# Patient Record
Sex: MALE | Race: WHITE | Employment: FULL TIME | URBAN - METROPOLITAN AREA
[De-identification: names, ages, dates, MRNs, and addresses within clinical notes are randomized per-mention and may not be internally consistent; named-entity substitution may affect disease eponyms.]

---

## 2018-01-09 ENCOUNTER — HOSPITAL ENCOUNTER (OUTPATIENT)
Age: 43
Discharge: HOME | End: 2018-01-09
Payer: COMMERCIAL

## 2018-01-09 VITALS
OXYGEN SATURATION: 98 % | HEART RATE: 84 BPM | TEMPERATURE: 98 F | SYSTOLIC BLOOD PRESSURE: 111 MMHG | DIASTOLIC BLOOD PRESSURE: 75 MMHG | RESPIRATION RATE: 16 BRPM

## 2018-01-09 DIAGNOSIS — J11.1 INFLUENZA: Primary | ICD-10-CM

## 2018-01-09 PROCEDURE — 99214 OFFICE O/P EST MOD 30 MIN: CPT

## 2018-01-09 PROCEDURE — 99213 OFFICE O/P EST LOW 20 MIN: CPT

## 2018-01-09 RX ORDER — ACETAMINOPHEN 500 MG
1000 TABLET ORAL EVERY 6 HOURS PRN
COMMUNITY

## 2018-01-09 RX ORDER — OSELTAMIVIR PHOSPHATE 75 MG/1
75 CAPSULE ORAL 2 TIMES DAILY
Qty: 10 CAPSULE | Refills: 0 | Status: SHIPPED | OUTPATIENT
Start: 2018-01-09

## 2018-01-09 NOTE — ED PROVIDER NOTES
Patient Seen in: [facility]    History   Patient presents with:  Fever    Stated Complaint: fever/flu symptoms    HPI    41-year-old male here with complaint of fever cough body aches and flulike symptoms.   Patient was here yest Pupils are equal, round, and reactive to light. Neck: Normal range of motion. Neck supple. Cardiovascular: Normal rate, regular rhythm, normal heart sounds and intact distal pulses. Pulmonary/Chest: Effort normal. He has rhonchi.    Genitourinary: Re

## 2018-01-09 NOTE — ED INITIAL ASSESSMENT (HPI)
Onset last night with fever t-max 103 with chills and body aches. Took Tylenol at 0600 AM. Exposed to spouse diagnosed with flu-like symptoms.

## 2020-03-01 ENCOUNTER — HOSPITAL ENCOUNTER (OUTPATIENT)
Age: 45
Discharge: HOME | End: 2020-03-01
Payer: COMMERCIAL

## 2020-03-01 VITALS
TEMPERATURE: 99 F | HEIGHT: 70 IN | HEART RATE: 72 BPM | BODY MASS INDEX: 25.05 KG/M2 | OXYGEN SATURATION: 98 % | WEIGHT: 175 LBS | DIASTOLIC BLOOD PRESSURE: 98 MMHG | SYSTOLIC BLOOD PRESSURE: 152 MMHG | RESPIRATION RATE: 16 BRPM

## 2020-03-01 DIAGNOSIS — J22 LOWER RESPIRATORY INFECTION (E.G., BRONCHITIS, PNEUMONIA, PNEUMONITIS, PULMONITIS): ICD-10-CM

## 2020-03-01 DIAGNOSIS — J01.40 ACUTE NON-RECURRENT PANSINUSITIS: Primary | ICD-10-CM

## 2020-03-01 PROCEDURE — 94640 AIRWAY INHALATION TREATMENT: CPT

## 2020-03-01 PROCEDURE — 99214 OFFICE O/P EST MOD 30 MIN: CPT

## 2020-03-01 RX ORDER — IPRATROPIUM BROMIDE AND ALBUTEROL SULFATE 2.5; .5 MG/3ML; MG/3ML
3 SOLUTION RESPIRATORY (INHALATION) ONCE
Status: COMPLETED | OUTPATIENT
Start: 2020-03-01 | End: 2020-03-01

## 2020-03-01 RX ORDER — ALBUTEROL SULFATE 2.5 MG/3ML
2.5 SOLUTION RESPIRATORY (INHALATION) ONCE
Status: COMPLETED | OUTPATIENT
Start: 2020-03-01 | End: 2020-03-01

## 2020-03-01 RX ORDER — PREDNISONE 20 MG/1
40 TABLET ORAL DAILY
Qty: 8 TABLET | Refills: 0 | Status: SHIPPED | OUTPATIENT
Start: 2020-03-02 | End: 2020-03-06

## 2020-03-01 RX ORDER — ALBUTEROL SULFATE 90 UG/1
2 AEROSOL, METERED RESPIRATORY (INHALATION) EVERY 4 HOURS PRN
Qty: 1 INHALER | Refills: 0 | Status: SHIPPED | OUTPATIENT
Start: 2020-03-01 | End: 2020-03-31

## 2020-03-01 RX ORDER — PREDNISONE 20 MG/1
60 TABLET ORAL ONCE
Status: COMPLETED | OUTPATIENT
Start: 2020-03-01 | End: 2020-03-01

## 2020-03-01 RX ORDER — DOXYCYCLINE HYCLATE 100 MG/1
100 CAPSULE ORAL 2 TIMES DAILY
Qty: 14 CAPSULE | Refills: 0 | Status: SHIPPED | OUTPATIENT
Start: 2020-03-01 | End: 2020-03-08

## 2020-03-01 NOTE — ED PROVIDER NOTES
Patient Seen in: [facility]      History   Patient presents with:  Cough/URI    Stated Complaint: SINUS PRESSURE X 4 DAYS    HPI    16-year-old male who comes in with approximately a week and a half of upper respiratory complain tenderness   Throat: Lips, tongue, and mucosa are moist, pink, and intact; teeth intact.  Mild erythema, no exudates or tonsillar hypertrophy, uvula midline, no trismus or drooling   Neck: Supple; no anterior or posterior cervical adenopathy  Lungs: inspira total) by mouth daily for 4 days. , Normal, Disp-8 tablet, R-0    Doxycycline Hyclate 100 MG Oral Cap  Take 1 capsule (100 mg total) by mouth 2 (two) times daily for 7 days. , Normal, Disp-14 capsule, R-0      I have given the patient instructions regarding

## 2023-02-28 ENCOUNTER — HOSPITAL ENCOUNTER (OUTPATIENT)
Age: 48
Discharge: HOME | End: 2023-02-28
Attending: EMERGENCY MEDICINE
Payer: COMMERCIAL

## 2023-02-28 VITALS
RESPIRATION RATE: 18 BRPM | TEMPERATURE: 98 F | SYSTOLIC BLOOD PRESSURE: 141 MMHG | DIASTOLIC BLOOD PRESSURE: 88 MMHG | WEIGHT: 177 LBS | OXYGEN SATURATION: 97 % | BODY MASS INDEX: 25 KG/M2 | HEART RATE: 71 BPM

## 2023-02-28 DIAGNOSIS — J40 BRONCHITIS: Primary | ICD-10-CM

## 2023-02-28 DIAGNOSIS — J98.01 ACUTE BRONCHOSPASM: ICD-10-CM

## 2023-02-28 PROCEDURE — 99213 OFFICE O/P EST LOW 20 MIN: CPT

## 2023-02-28 RX ORDER — ALBUTEROL SULFATE 90 UG/1
2 AEROSOL, METERED RESPIRATORY (INHALATION) EVERY 4 HOURS PRN
Qty: 1 EACH | Refills: 0 | Status: SHIPPED | OUTPATIENT
Start: 2023-02-28 | End: 2023-03-30

## 2023-02-28 RX ORDER — ALBUTEROL SULFATE 90 UG/1
AEROSOL, METERED RESPIRATORY (INHALATION) EVERY 6 HOURS PRN
COMMUNITY

## 2023-02-28 RX ORDER — PREDNISONE 20 MG/1
40 TABLET ORAL DAILY
Qty: 10 TABLET | Refills: 0 | Status: SHIPPED | OUTPATIENT
Start: 2023-02-28 | End: 2023-03-05

## 2023-03-01 NOTE — ED INITIAL ASSESSMENT (HPI)
Patient presents to IC with c/o cough x 8 days with intermittent wheezing. Home covid tests have been negative. h/o asthma and out of inhaler.

## 2024-09-06 ENCOUNTER — HOSPITAL ENCOUNTER (OUTPATIENT)
Age: 49
Discharge: HOME | End: 2024-09-06
Payer: COMMERCIAL

## 2024-09-06 VITALS
HEART RATE: 57 BPM | OXYGEN SATURATION: 99 % | HEIGHT: 70 IN | RESPIRATION RATE: 18 BRPM | TEMPERATURE: 99 F | DIASTOLIC BLOOD PRESSURE: 84 MMHG | WEIGHT: 175 LBS | BODY MASS INDEX: 25.05 KG/M2 | SYSTOLIC BLOOD PRESSURE: 175 MMHG

## 2024-09-06 DIAGNOSIS — J30.1 SEASONAL ALLERGIC RHINITIS DUE TO POLLEN: Primary | ICD-10-CM

## 2024-09-06 PROCEDURE — 99212 OFFICE O/P EST SF 10 MIN: CPT

## 2024-09-06 PROCEDURE — 99213 OFFICE O/P EST LOW 20 MIN: CPT

## 2024-09-06 NOTE — DISCHARGE INSTRUCTIONS
Rest and drink plenty of fluids.   Use Zyrtec, Claritin, or Allegra twice a day to help dry up the sinuses.  Get Sudafed from behind the pharmacy counter to help with sinus pressure and headaches.   Continue to use your Flonase 1 spray each nostril twice a day.    Take Tylenol and/or ibuprofen as needed for pain.   Try warm, moist compresses to also help with pressure.   Follow up with your PCP in 7 days if not improved.

## 2024-09-06 NOTE — ED PROVIDER NOTES
Patient Seen in: [facility] [place]      History     Chief Complaint   Patient presents with    Nasal Congestion     Stated Complaint: sinus    Subjective:   47 yo male presents to the immediate care with c/o nasal congestion.  Patient states he started with a sore throat last Thursday or Friday.  The sore throat lasted for a day and then he had nasal congestion.  He got better on Tuesday and Wednesday, but felt like it hit again yesterday.  He takes Claritin and Flonase daily.  He has had some sinus pressure, but not pain.  He has environmental allergies that are worse in the fall.  He denies any fever, chills, headaches, sinus pain, sore throat, or cough.  He states the mucus has been white to clear in color.     The history is provided by the patient.         Objective:   History reviewed. No pertinent past medical history.           Past Surgical History:   Procedure Laterality Date    Tonsillectomy                  No pertinent social history.            Review of Systems   Constitutional: Negative.  Negative for chills and fever.   HENT:  Positive for congestion, postnasal drip and sinus pressure. Negative for ear discharge, ear pain, rhinorrhea, sinus pain, sore throat, trouble swallowing and voice change.    Respiratory: Negative.  Negative for cough, chest tightness and shortness of breath.    All other systems reviewed and are negative.      Positive for stated Chief Complaint: Nasal Congestion    Other systems are as noted in HPI.  Constitutional and vital signs reviewed.      All other systems reviewed and negative except as noted above.    Physical Exam     ED Triage Vitals [09/06/24 1807]   BP (!) 175/84   Pulse 57   Resp 18   Temp 98.7 °F (37.1 °C)   Temp src Temporal   SpO2 99 %   O2 Device None (Room air)       Current Vitals:   Vital Signs  BP: (!) 175/84  Pulse: 57  Resp: 18  Temp: 98.7 °F (37.1 °C)  Temp src: Temporal    Oxygen Therapy  SpO2: 99 %  O2 Device: None (Room  air)            Physical Exam  Vitals and nursing note reviewed.   Constitutional:       General: He is not in acute distress.     Appearance: Normal appearance. He is normal weight. He is not ill-appearing.   HENT:      Head: Normocephalic and atraumatic.      Right Ear: Tympanic membrane, ear canal and external ear normal.      Left Ear: Tympanic membrane, ear canal and external ear normal.      Nose: Congestion present.      Right Turbinates: Enlarged and pale.      Left Turbinates: Enlarged and pale.      Mouth/Throat:      Mouth: Mucous membranes are moist.      Pharynx: Oropharynx is clear.   Eyes:      Conjunctiva/sclera: Conjunctivae normal.      Pupils: Pupils are equal, round, and reactive to light.   Cardiovascular:      Rate and Rhythm: Normal rate and regular rhythm.      Pulses: Normal pulses.      Heart sounds: Normal heart sounds.   Pulmonary:      Effort: Pulmonary effort is normal. No respiratory distress.      Breath sounds: Normal breath sounds.   Musculoskeletal:         General: Normal range of motion.   Skin:     General: Skin is warm and dry.   Neurological:      General: No focal deficit present.      Mental Status: He is alert and oriented to person, place, and time.   Psychiatric:         Mood and Affect: Mood normal.         Behavior: Behavior normal.           ED Course   Labs Reviewed - No data to display           MDM                  Medical Decision Making  48-year-old male with history and exam consistent with allergic rhinitis.  Discussed with patient better support with over-the-counter medications such as Sudafed and nasal sprays.  Patient also encouraged to increase his antihistamines to help with congestion.  No evidence of sepsis, otitis media, otitis externa, bacterial sinusitis, strep, or other bacterial etiology.  Patient recommended to follow-up with his PCP in 1 week as needed.    Risk  OTC drugs.        Disposition and Plan     Clinical Impression:  1. Seasonal allergic  rhinitis due to pollen         Disposition:  Discharge  9/6/2024  6:33 pm    Follow-up:  Toño Walton MD  [address] [address]  [phone]    In 1 week  As needed          Medications Prescribed:  Discharge Medication List as of 9/6/2024  6:34 PM

## 2024-09-06 NOTE — ED INITIAL ASSESSMENT (HPI)
Pt states last Thurs started with post nasal drip/sore throat and then developed into nasal congestion on Sat.     Pt now c/o sinus pain/congestion.   Denies fever.   Pt states he was negative for covid on Tues at home.

## 2024-12-04 ENCOUNTER — PATIENT MESSAGE (OUTPATIENT)
Facility: CLINIC | Age: 49
End: 2024-12-04

## 2024-12-04 ENCOUNTER — TELEPHONE (OUTPATIENT)
Facility: CLINIC | Age: 49
End: 2024-12-04

## 2024-12-04 DIAGNOSIS — M25.562 LEFT KNEE PAIN, UNSPECIFIED CHRONICITY: Primary | ICD-10-CM

## 2024-12-04 NOTE — TELEPHONE ENCOUNTER
XR ordered and scheduled per Ortho protocol.   Sent patient message via [employer] to inform them and ask them to arrive 15-20 minutes prior to appointment with Dr. Fernandes

## 2024-12-04 NOTE — TELEPHONE ENCOUNTER
Future Appointments   Date Time Provider Department Center   12/5/2024 12:30 PM PF XR [facility] 2 PF XRAY [place]   12/5/2024  1:00 PM Toño Fernandes MD [facility] [address]

## 2024-12-04 NOTE — TELEPHONE ENCOUNTER
New pt appt left knee no imaging available  Future Appointments  12/5/2024  1:00 PM    Toño Fernandes MD             [facility]        [facility] [address]

## 2024-12-05 ENCOUNTER — OFFICE VISIT (OUTPATIENT)
Facility: CLINIC | Age: 49
End: 2024-12-05
Payer: COMMERCIAL

## 2024-12-05 ENCOUNTER — HOSPITAL ENCOUNTER (OUTPATIENT)
Dept: GENERAL RADIOLOGY | Age: 49
Discharge: HOME | End: 2024-12-05
Attending: STUDENT IN AN ORGANIZED HEALTH CARE EDUCATION/TRAINING PROGRAM
Payer: COMMERCIAL

## 2024-12-05 VITALS — HEIGHT: 70 IN | BODY MASS INDEX: 25.77 KG/M2 | WEIGHT: 180 LBS

## 2024-12-05 DIAGNOSIS — M25.562 LEFT KNEE PAIN, UNSPECIFIED CHRONICITY: ICD-10-CM

## 2024-12-05 DIAGNOSIS — S76.112A STRAIN OF LEFT QUADRICEPS MUSCLE, INITIAL ENCOUNTER: Primary | ICD-10-CM

## 2024-12-05 PROCEDURE — 73564 X-RAY EXAM KNEE 4 OR MORE: CPT | Performed by: STUDENT IN AN ORGANIZED HEALTH CARE EDUCATION/TRAINING PROGRAM

## 2024-12-05 PROCEDURE — 99203 OFFICE O/P NEW LOW 30 MIN: CPT | Performed by: STUDENT IN AN ORGANIZED HEALTH CARE EDUCATION/TRAINING PROGRAM

## 2024-12-05 PROCEDURE — 3008F BODY MASS INDEX DOCD: CPT | Performed by: STUDENT IN AN ORGANIZED HEALTH CARE EDUCATION/TRAINING PROGRAM

## 2024-12-05 RX ORDER — CELECOXIB 200 MG/1
200 CAPSULE ORAL 2 TIMES DAILY
Qty: 60 CAPSULE | Refills: 0 | Status: SHIPPED | OUTPATIENT
Start: 2024-12-05 | End: 2025-01-04

## 2024-12-05 NOTE — PROGRESS NOTES
Orthopaedic Surgery  [id number] [place] [address]   [phone]      Chief Complaint:  Left Knee Pain    History of Present Illness:   Johnny Casey is a 49 year old male seen in clinic today as new for evaluation of their left knee. This has been ongoing since mid October. Pain is located over the anterior aspect of the knee and described as aching, burning. Their joint pain interferes with their activities of daily life such as going up or down stairs, getting into and out of his car, sleeping on his side, and exercising. He is an avid runner and has run 20+ marathons. He reports that with onset he treated it with rest and it seemed to go away. After about 1 week without pain, he went on a run for several miles. The next morning he developed worsening pain over the anterior knee which has persisted. It is worse with motion. Their symptoms are made better by rest. They report no lower back, hip, or thigh pain. No prior surgeries to the involved knee.    Prior imaging studies have included none. Treatment so far has consisted of conservative management including Tylenol and home exercises.      PMH/PSH/Family History/Social History/Meds/Allergies:   History reviewed. No pertinent past medical history.     Past Surgical History:   Procedure Laterality Date    Tonsillectomy          History reviewed. No pertinent family history.     Social History     Socioeconomic History    Marital status:      Spouse name: Not on file    Number of children: Not on file    Years of education: Not on file    Highest education level: Not on file   Occupational History    Not on file   Tobacco Use    Smoking status: Never    Smokeless tobacco: Never   Substance and Sexual Activity    Alcohol use: Not on file    Drug use: Not on file    Sexual activity: Not on file   Other Topics Concern    Not on file   Social History Narrative    Not on file     Social Drivers of Health     Financial Resource Strain:  Not on file   Food Insecurity: Not on file   Transportation Needs: Not on file   Physical Activity: Not on file   Stress: Not on file   Social Connections: Unknown (3/11/2021)    Received from [facility]    Social Connections     Conversations with friends/family/neighbors per week: Not on file   Housing Stability: Low Risk  (7/7/2021)    Received from [facility]    Housing Stability     Mortgage Payment Concerns?: Not on file     Number of Places Lived in the Last Year: Not on file     Unstable Housing?: Not on file        Current Outpatient Medications   Medication Instructions    acetaminophen (TYLENOL EXTRA STRENGTH) 1,000 mg, Every 6 hours PRN    albuterol 108 (90 Base) MCG/ACT Inhalation Aero Soln Every 6 hours PRN    celecoxib (CELEBREX) 200 mg, Oral, 2 times daily    naproxen (NAPROSYN) 500 mg, Oral, 2 times daily PRN    oseltamivir (TAMIFLU) 75 mg, Oral, 2 times daily        Allergies[1]       Physical Exam:   Vitals:    12/05/24 1258   Weight: 180 lb (81.6 kg)   Height: 5' 10\" (1.778 m)     Estimated body mass index is 25.83 kg/m² as calculated from the following:    Height as of this encounter: 5' 10\" (1.778 m).    Weight as of this encounter: 180 lb (81.6 kg).    Constitutional: No acute distress, well nourished.  Eyes: Anicteric sclera, pink conjunctiva  Ears, Nose, Mouth and Throat: Normocephalic, atraumatic, moist mucous membranes  Cardiovascular: No pitting edema or varicosities in the lower extremities. Maneuvers demonstrate a negative Rodrigo's sign. No palpable cords in calf noted.   Respiratory: No respiratory distress, normal respiratory rhythm and effort   Neurological:  Oriented to person, place, and time.  Psychological:  Appropriate mood and affect.    Comprehensive Left Knee Exam:      Inspection: No erythema, ecchymoses, or wounds. No rash. No previous incisions noted. Mild effusion. No appreciable quad atrophy  Alignment: neutral  ROM: 0 - 100  degrees, flexion contracture: 0 degrees, quad lag: no  Stability: A/P stress: stable, firm endpoint, M/L stress: stable, firm endpoint at 0 and 30 degrees flexion  Extensor mechanism is intact  Pain or crepitus with ROM?: Yes. No pain with patellar grind  Tenderness to palpation at: quadriceps tendon though there is no palpable step off or defect; Non-tender at: medial joint line, lateral joint line, patella, along the distal IT band toward Gerdy's tubercle, medial knee about the pes anserinus, patellar tendon, tibial tubercle  Strength: Intact 5/5 strength SLR and TA/GS/FHL/EHL  Sensation: Grossly intact to light touch over SPN/DPN/Saph/Sural/Tibial nerve distributions  Vasc: Warm perfused extremity        Imaging:   Weightbearing XRs of the left knee were obtained with AP, PA Flex, sunrise, and lateral views    They show mild degenerative changes of the knee involving the medial compartment(s) with joint space narrowing and subchondral sclerosis. No fracture or dislocation seen    I personally reviewed and interpreted the radiographs.      Assessment:     ICD-10-CM    1. Strain of left quadriceps muscle, initial encounter  S76.112A MRI KNEE, LEFT ([id number]=[id number])             Plan:   Recommend obtaining MRI Left Knee to rule out quadriceps tendon tear  Patient is highly active with point tenderness over the quadriceps tendon  Extensor mechanism appears intact clinically but may be from intact retinaculum     Will plan for follow up after MRI is obtained    Thank you very much for allowing me to participate in the care of this patient. If you have any questions, please do not hesitate to contact me.      Toño Fernandes MD  Adult Hip and Knee Reconstruction    Department of Orthopaedic Surgery  [facility]     [address] [place] [address]  [address] [place] [address]     t: [phone]  f: [phone]       [email or web address]       [1]   Allergies  Allergen Reactions    Penicillins  SWELLING

## 2024-12-15 ENCOUNTER — HOSPITAL ENCOUNTER (OUTPATIENT)
Dept: MRI IMAGING | Facility: HOSPITAL | Age: 49
Discharge: HOME | End: 2024-12-15
Attending: STUDENT IN AN ORGANIZED HEALTH CARE EDUCATION/TRAINING PROGRAM
Payer: COMMERCIAL

## 2024-12-15 ENCOUNTER — HOSPITAL ENCOUNTER (OUTPATIENT)
Dept: MRI IMAGING | Facility: HOSPITAL | Age: 49
End: 2024-12-15
Attending: STUDENT IN AN ORGANIZED HEALTH CARE EDUCATION/TRAINING PROGRAM
Payer: COMMERCIAL

## 2024-12-15 DIAGNOSIS — S76.112A STRAIN OF LEFT QUADRICEPS MUSCLE, INITIAL ENCOUNTER: ICD-10-CM

## 2024-12-15 PROCEDURE — 73721 MRI JNT OF LWR EXTRE W/O DYE: CPT | Performed by: STUDENT IN AN ORGANIZED HEALTH CARE EDUCATION/TRAINING PROGRAM

## 2024-12-18 DIAGNOSIS — M76.899 QUADRICEPS TENDINITIS: Primary | ICD-10-CM

## 2025-01-21 ENCOUNTER — OFFICE VISIT (OUTPATIENT)
Dept: FAMILY MEDICINE CLINIC | Facility: CLINIC | Age: 50
End: 2025-01-21
Payer: COMMERCIAL

## 2025-01-21 VITALS
SYSTOLIC BLOOD PRESSURE: 122 MMHG | HEIGHT: 70 IN | WEIGHT: 189 LBS | RESPIRATION RATE: 18 BRPM | HEART RATE: 67 BPM | DIASTOLIC BLOOD PRESSURE: 80 MMHG | BODY MASS INDEX: 27.06 KG/M2 | OXYGEN SATURATION: 99 %

## 2025-01-21 DIAGNOSIS — Z00.00 LABORATORY EXAMINATION ORDERED AS PART OF A ROUTINE GENERAL MEDICAL EXAMINATION: ICD-10-CM

## 2025-01-21 DIAGNOSIS — Z12.5 SCREENING FOR MALIGNANT NEOPLASM OF PROSTATE: ICD-10-CM

## 2025-01-21 DIAGNOSIS — J45.20 MILD INTERMITTENT ASTHMA WITHOUT COMPLICATION (HCC): ICD-10-CM

## 2025-01-21 DIAGNOSIS — Z00.00 ANNUAL PHYSICAL EXAM: Primary | ICD-10-CM

## 2025-01-21 DIAGNOSIS — Z12.11 SCREENING FOR MALIGNANT NEOPLASM OF COLON: ICD-10-CM

## 2025-01-21 LAB
ALBUMIN SERPL-MCNC: 4.8 G/DL (ref 3.2–4.8)
ALBUMIN/GLOB SERPL: 1.6 {RATIO} (ref 1–2)
ALP LIVER SERPL-CCNC: 63 U/L
ALT SERPL-CCNC: 21 U/L
ANION GAP SERPL CALC-SCNC: 7 MMOL/L (ref 0–18)
AST SERPL-CCNC: 26 U/L (ref ?–34)
BASOPHILS # BLD AUTO: 0.06 X10(3) UL (ref 0–0.2)
BASOPHILS NFR BLD AUTO: 0.7 %
BILIRUB SERPL-MCNC: 0.7 MG/DL (ref 0.3–1.2)
BUN BLD-MCNC: 17 MG/DL (ref 9–23)
CALCIUM BLD-MCNC: 10.3 MG/DL (ref 8.7–10.6)
CHLORIDE SERPL-SCNC: 105 MMOL/L (ref 98–112)
CHOLEST SERPL-MCNC: 184 MG/DL (ref ?–200)
CO2 SERPL-SCNC: 29 MMOL/L (ref 21–32)
CREAT BLD-MCNC: 1.32 MG/DL
EGFRCR SERPLBLD CKD-EPI 2021: 66 ML/MIN/1.73M2 (ref 60–?)
EOSINOPHIL # BLD AUTO: 0.2 X10(3) UL (ref 0–0.7)
EOSINOPHIL NFR BLD AUTO: 2.5 %
ERYTHROCYTE [DISTWIDTH] IN BLOOD BY AUTOMATED COUNT: 12.2 %
EST. AVERAGE GLUCOSE BLD GHB EST-MCNC: 103 MG/DL (ref 68–126)
FASTING PATIENT LIPID ANSWER: YES
FASTING STATUS PATIENT QL REPORTED: YES
GLOBULIN PLAS-MCNC: 3 G/DL (ref 2–3.5)
GLUCOSE BLD-MCNC: 97 MG/DL (ref 70–99)
HBA1C MFR BLD: 5.2 % (ref ?–5.7)
HCT VFR BLD AUTO: 43 %
HDLC SERPL-MCNC: 47 MG/DL (ref 40–59)
HGB BLD-MCNC: 15.4 G/DL
IMM GRANULOCYTES # BLD AUTO: 0.02 X10(3) UL (ref 0–1)
IMM GRANULOCYTES NFR BLD: 0.2 %
LDLC SERPL CALC-MCNC: 121 MG/DL (ref ?–100)
LYMPHOCYTES # BLD AUTO: 1.93 X10(3) UL (ref 1–4)
LYMPHOCYTES NFR BLD AUTO: 23.7 %
MCH RBC QN AUTO: 31.4 PG (ref 26–34)
MCHC RBC AUTO-ENTMCNC: 35.8 G/DL (ref 31–37)
MCV RBC AUTO: 87.6 FL
MONOCYTES # BLD AUTO: 0.83 X10(3) UL (ref 0.1–1)
MONOCYTES NFR BLD AUTO: 10.2 %
NEUTROPHILS # BLD AUTO: 5.11 X10 (3) UL (ref 1.5–7.7)
NEUTROPHILS # BLD AUTO: 5.11 X10(3) UL (ref 1.5–7.7)
NEUTROPHILS NFR BLD AUTO: 62.7 %
NONHDLC SERPL-MCNC: 137 MG/DL (ref ?–130)
OSMOLALITY SERPL CALC.SUM OF ELEC: 293 MOSM/KG (ref 275–295)
PLATELET # BLD AUTO: 310 10(3)UL (ref 150–450)
POTASSIUM SERPL-SCNC: 4.7 MMOL/L (ref 3.5–5.1)
PROT SERPL-MCNC: 7.8 G/DL (ref 5.7–8.2)
RBC # BLD AUTO: 4.91 X10(6)UL
SODIUM SERPL-SCNC: 141 MMOL/L (ref 136–145)
TRIGL SERPL-MCNC: 84 MG/DL (ref 30–149)
TSI SER-ACNC: 1.35 UIU/ML (ref 0.55–4.78)
VLDLC SERPL CALC-MCNC: 15 MG/DL (ref 0–30)
WBC # BLD AUTO: 8.2 X10(3) UL (ref 4–11)

## 2025-01-21 PROCEDURE — 83036 HEMOGLOBIN GLYCOSYLATED A1C: CPT | Performed by: STUDENT IN AN ORGANIZED HEALTH CARE EDUCATION/TRAINING PROGRAM

## 2025-01-21 PROCEDURE — 80050 GENERAL HEALTH PANEL: CPT | Performed by: STUDENT IN AN ORGANIZED HEALTH CARE EDUCATION/TRAINING PROGRAM

## 2025-01-21 PROCEDURE — 80061 LIPID PANEL: CPT | Performed by: STUDENT IN AN ORGANIZED HEALTH CARE EDUCATION/TRAINING PROGRAM

## 2025-01-21 PROCEDURE — 84153 ASSAY OF PSA TOTAL: CPT | Performed by: STUDENT IN AN ORGANIZED HEALTH CARE EDUCATION/TRAINING PROGRAM

## 2025-01-21 PROCEDURE — 84154 ASSAY OF PSA FREE: CPT | Performed by: STUDENT IN AN ORGANIZED HEALTH CARE EDUCATION/TRAINING PROGRAM

## 2025-01-21 RX ORDER — LORATADINE 10 MG/1
10 TABLET ORAL DAILY
COMMUNITY

## 2025-01-21 RX ORDER — ALBUTEROL SULFATE 90 UG/1
INHALANT RESPIRATORY (INHALATION) AS NEEDED
COMMUNITY
End: 2025-01-21

## 2025-01-21 RX ORDER — ALBUTEROL SULFATE 90 UG/1
2 INHALANT RESPIRATORY (INHALATION)
Qty: 18 G | Refills: 1 | Status: SHIPPED | OUTPATIENT
Start: 2025-01-21

## 2025-01-21 NOTE — PATIENT INSTRUCTIONS
Take your blood pressure once in the morning and once in the evening for at least 7 days.   Take your morning blood pressure before coffee.   Take your evening blood pressure before bed time.   If your average blood pressure over 7 days is close to 120/80 then this is good.   If however your average blood pressure over 7 days is at 140/90 or above you may require blood pressure medications (please let me know if this is the case through [employer]).     The following pages have more information about how to check the blood pressure accurately at home.

## 2025-01-21 NOTE — PROGRESS NOTES
[facility] Office Note    HPI:     Johnny Casey is a 49 year old male presenting for annual exam.     Has hx of asthma, well-controlled, endorses he barely uses albuterol.     Diet: mostly home-cooked  Exercise: pretty regular  Tobacco: denies   Alcohol: socially   Other Drugs: occasional marijuana gumy    AAA ultrasound screen? (age 65-75 with any smoking history): not indicated  Aspirin use? (age 50-59 with ASCVD risk 10% or greater): not indicated  Colonoscopy screen? (age 45-75 or earlier based on risk): not indicated  Depression screen? (PHQ-2 or PHQ-9): PHQ-2 Score of 0  Diabetes screen? (age 35 to 70 who are overweight or obese): A1c ordered  Fall Prevention? (age 65 and older): not indicated  Lipid panel? (age 20-35 with increased risk of CHD or older than 35): ordered  Lung cancer screen? (age 50-80 w/ 20 pack year smoking history and currently smoking or quit in last 15 yrs): not indicated    HISTORY:  History reviewed. No pertinent past medical history.   Past Surgical History:   Procedure Laterality Date    Tonsillectomy        Family History   Problem Relation Age of Onset    Heart Disorder Father     Cancer Father     Cancer Maternal Grandfather       Social History:   Social History     Socioeconomic History    Marital status:    Tobacco Use    Smoking status: Never    Smokeless tobacco: Never   Vaping Use    Vaping status: Never Used   Substance and Sexual Activity    Alcohol use: Yes     Comment: socially    Drug use: Not Currently     Types: Cannabis     Social Drivers of Health     Food Insecurity: No Food Insecurity (1/21/2025)    NCSS - Food Insecurity     Worried About Running Out of Food in the Last Year: No     Ran Out of Food in the Last Year: No   Transportation Needs: No Transportation Needs (1/21/2025)    NCSS - Transportation     Lack of Transportation: No    Received from [facility]    Social Connections   Housing Stability: Not  At Risk (1/21/2025)    NCSS - Housing/Utilities     Has Housing: Yes     Worried About Losing Housing: No     Unable to Get Utilities: No        Medications (Active prior to today's visit):  Current Outpatient Medications   Medication Sig Dispense Refill    loratadine 10 MG Oral Tab Take 1 tablet (10 mg total) by mouth daily.      albuterol 108 (90 Base) MCG/ACT Inhalation Aero Soln Inhale 2 puffs into the lungs every 4 to 6 hours as needed for Wheezing. 18 g 1       Allergies:  Allergies[1]      ROS:   Review of Systems   Constitutional:  Negative for chills and fever.     Otherwise see HPI    PHYSICAL EXAM:   /80 (BP Location: Left arm, Patient Position: Sitting, Cuff Size: adult)   Pulse 67   Resp 18   Ht 5' 10\" (1.778 m)   Wt 189 lb (85.7 kg)   SpO2 99%   BMI 27.12 kg/m²  Estimated body mass index is 27.12 kg/m² as calculated from the following:    Height as of this encounter: 5' 10\" (1.778 m).    Weight as of this encounter: 189 lb (85.7 kg).   Vital signs reviewed.Appears stated age, well groomed.    Physical Exam  Constitutional:       General: He is not in acute distress.  HENT:      Nose: Nose normal.      Mouth/Throat:      Mouth: Mucous membranes are moist.      Pharynx: Oropharynx is clear.   Eyes:      Conjunctiva/sclera: Conjunctivae normal.      Pupils: Pupils are equal, round, and reactive to light.   Cardiovascular:      Rate and Rhythm: Normal rate and regular rhythm.      Heart sounds: Normal heart sounds.   Pulmonary:      Effort: Pulmonary effort is normal.      Breath sounds: Normal breath sounds.   Abdominal:      General: Bowel sounds are normal.      Palpations: Abdomen is soft.      Tenderness: There is no abdominal tenderness. There is no guarding or rebound.   Lymphadenopathy:      Cervical: No cervical adenopathy.   Neurological:      Mental Status: He is alert.           ASSESSMENT/PLAN:     49 year old male presenting for annual exam.       1. Annual physical exam  -  encourage well-balanced diet with fruits/vegetables, limited fried/fatty foods, and limited take-out   - encourage at least 150 minutes of moderate intensity aerobic activity weekly     2. Laboratory examination ordered as part of a routine general medical examination  - CBC With Differential With Platelet; Future  - Comp Metabolic Panel (14); Future  - Hemoglobin A1C; Future  - TSH W Reflex To Free T4; Future  - Lipid Panel; Future    3. Screening for malignant neoplasm of colon  - Gastro Referral - In Network    4. Screening for malignant neoplasm of prostate  - PSA, Total W Reflex To Free [E]; Future    5. Mild intermittent asthma without complication (HCC)  - stable, continue albuterol prn   - albuterol 108 (90 Base) MCG/ACT Inhalation Aero Soln; Inhale 2 puffs into the lungs every 4 to 6 hours as needed for Wheezing.  Dispense: 18 g; Refill: 1    Follow-up: in 1 year for next annual or sooner as needed    Outcome: Patient verbalizes understanding. Patient is notified to call with any questions, complications, allergies, or worsening or changing symptoms.  Patient is to call with any side effects or complications from the treatments as a result of today.     Total length of visit/charting: approximately 20 min    Lucas Graham MD, 01/21/25, 7:12 AM      Please note that portions of this note may have been completed with a voice recognition program. Efforts were made to edit the dictations but occasionally words are mis-transcribed.         [1]   Allergies  Allergen Reactions    Penicillins SWELLING    Seasonal OTHER (SEE COMMENTS)     SNEEZING, ITCHY EYES, HAY FEVER

## 2025-01-22 LAB
PROSTATE SPECIFIC AG: 0.9 NG/ML
PROSTATE SPECIFIC AG: 0.9 NG/ML

## 2025-03-11 ENCOUNTER — PATIENT MESSAGE (OUTPATIENT)
Facility: CLINIC | Age: 50
End: 2025-03-11

## 2025-03-11 DIAGNOSIS — S76.112A STRAIN OF LEFT QUADRICEPS MUSCLE, INITIAL ENCOUNTER: Primary | ICD-10-CM

## 2025-03-12 NOTE — TELEPHONE ENCOUNTER
MRI: CONCLUSION:   Severe quadriceps tendinosis with partial-thickness insertional tearing.  No full-thickness tear or retraction.      Nondisplaced fracture of the medial patellar facet with extensive marrow edema throughout the entire patella.  No displaced fracture.

## 2025-03-13 ENCOUNTER — MED REC SCAN ONLY (OUTPATIENT)
Facility: CLINIC | Age: 50
End: 2025-03-13

## (undated) NOTE — LETTER
ASTHMA ACTION PLAN for Johnny Casey     : 10/20/1975     Date: 2025  Provider:  Lucas Graham MD  Phone for doctor or clinic: [facility], [address] [address] [phone]    ACT Score: 25      You can use the colors of a traffic light to help learn about your asthma medicines.      1. Green - Go! % of Personal Best Peak Flow Use controller medicine.   Breathing is good  No cough or wheeze  Can work and play Medicine How much to take When to take it    No daily medications.      2. Yellow - Caution. 50-79% Personal Best Peak  Flow.  Use reliever medicine to keep an asthma attack from getting bad.   Cough  Wheezing  Tight Chest  Wake up at night Medicine How much to take When to take it    Albuterol inhaler,  2 puffs every four hours as needed.         Additional instructions If your symptoms do not improve or require albuterol inhaler use every 4 to 6 hours, please contact our office at [phone]  and ask to speak with the nurse.          3. Red - Stop! Danger!  <50% Personal Best Peak  Flow. Take these medications until  Get help from a doctor   Medicine not helping  Breathing is hard and fast  Nose opens wide  Can't walk  Ribs show  Can't talk well Medicine How much to take When to take it    Albuterol Inhaler - Take 2 puffs every 5 minutes. If no relief after 2 treatments or symptoms are worse, please go to nearest ER or call 911 immediately.       Additional Instructions If your symptoms do not improve and you cannot contact your doctor, [facility] or call [facility] immediately!     [x] Asthma Action Plan reviewed with patient (and caregiver if necessary) and a copy of the plan was given to the patient/caregiver.   [] Asthma Action Plan reviewed with patient (and caregiver if necessary) on the phone and mailed copy to patient or submitted via [email or web address].     Signatures:  Provider  Lucas Graham MD   Patient  Caretaker